# Patient Record
Sex: FEMALE | Race: AMERICAN INDIAN OR ALASKA NATIVE
[De-identification: names, ages, dates, MRNs, and addresses within clinical notes are randomized per-mention and may not be internally consistent; named-entity substitution may affect disease eponyms.]

---

## 2020-03-11 ENCOUNTER — HOSPITAL ENCOUNTER (OUTPATIENT)
Dept: HOSPITAL 5 - XRAY | Age: 40
Discharge: HOME | End: 2020-03-11
Attending: GENERAL PRACTICE
Payer: MEDICARE

## 2020-03-11 DIAGNOSIS — R07.9: Primary | ICD-10-CM

## 2020-03-11 PROCEDURE — 71046 X-RAY EXAM CHEST 2 VIEWS: CPT

## 2020-03-11 NOTE — XRAY REPORT
CHEST PA AND LATERAL VIEWS



INDICATION: 

CHEST PAIN. 



COMPARISON: 

None



FINDINGS:



Support devices: None 



Heart: Normal 



Lungs/Pleura: No acute pulmonary or pleural findings.  





IMPRESSION:

1. No significant abnormality.



Signer Name: Abdulkadir Knox MD 

Signed: 3/11/2020 4:26 PM

Workstation Name: [id number]